# Patient Record
Sex: FEMALE
[De-identification: names, ages, dates, MRNs, and addresses within clinical notes are randomized per-mention and may not be internally consistent; named-entity substitution may affect disease eponyms.]

---

## 2023-05-01 ENCOUNTER — RX ONLY (OUTPATIENT)
Age: 48
Setting detail: RX ONLY
End: 2023-05-01

## 2024-07-22 ENCOUNTER — RX ONLY (OUTPATIENT)
Age: 49
Setting detail: RX ONLY
End: 2024-07-22

## 2024-07-23 ENCOUNTER — APPOINTMENT (RX ONLY)
Dept: URBAN - METROPOLITAN AREA CLINIC 129 | Facility: CLINIC | Age: 49
Setting detail: DERMATOLOGY
End: 2024-07-23

## 2024-07-23 DIAGNOSIS — L20.89 OTHER ATOPIC DERMATITIS: ICD-10-CM

## 2024-07-23 PROCEDURE — 99203 OFFICE O/P NEW LOW 30 MIN: CPT

## 2024-07-23 PROCEDURE — ? COUNSELING

## 2024-07-23 PROCEDURE — ? PRESCRIPTION

## 2024-07-23 RX ORDER — RUXOLITINIB 15 MG/G
CREAM TOPICAL
Qty: 60 | Refills: 5 | Status: ERX | COMMUNITY
Start: 2024-07-23

## 2024-07-23 RX ADMIN — RUXOLITINIB: 15 CREAM TOPICAL at 00:00

## 2024-07-23 ASSESSMENT — LOCATION DETAILED DESCRIPTION DERM
LOCATION DETAILED: LEFT SUPERIOR LATERAL MALAR CHEEK
LOCATION DETAILED: RIGHT SUPERIOR CENTRAL MALAR CHEEK
LOCATION DETAILED: LEFT SUPERIOR CENTRAL BUCCAL CHEEK
LOCATION DETAILED: INFERIOR MID FOREHEAD
LOCATION DETAILED: RIGHT SUPERIOR MEDIAL BUCCAL CHEEK

## 2024-07-23 ASSESSMENT — LOCATION SIMPLE DESCRIPTION DERM
LOCATION SIMPLE: LEFT CHEEK
LOCATION SIMPLE: INFERIOR FOREHEAD
LOCATION SIMPLE: RIGHT CHEEK

## 2024-07-23 ASSESSMENT — LOCATION ZONE DERM: LOCATION ZONE: FACE

## 2024-07-23 NOTE — HPI: RASH
What Type Of Note Output Would You Prefer (Optional)?: Standard Output
How Severe Is Your Rash?: moderate
Is This A New Presentation, Or A Follow-Up?: Rash
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 Additionally, desonide has been applied to affected areas, but did not help.

## 2024-08-12 ENCOUNTER — APPOINTMENT (RX ONLY)
Dept: URBAN - METROPOLITAN AREA CLINIC 129 | Facility: CLINIC | Age: 49
Setting detail: DERMATOLOGY
End: 2024-08-12

## 2024-08-12 DIAGNOSIS — L23.9 ALLERGIC CONTACT DERMATITIS, UNSPECIFIED CAUSE: ICD-10-CM

## 2024-08-12 PROCEDURE — 95044 PATCH/APPLICATION TESTS: CPT

## 2024-08-12 PROCEDURE — ? ADDITIONAL NOTES

## 2024-08-12 PROCEDURE — ? PATCH TESTING

## 2024-08-12 ASSESSMENT — LOCATION DETAILED DESCRIPTION DERM: LOCATION DETAILED: INFERIOR THORACIC SPINE

## 2024-08-12 ASSESSMENT — LOCATION SIMPLE DESCRIPTION DERM: LOCATION SIMPLE: UPPER BACK

## 2024-08-12 ASSESSMENT — LOCATION ZONE DERM: LOCATION ZONE: TRUNK

## 2024-08-12 NOTE — PROCEDURE: PATCH TESTING
Consent: Written consent obtained, risks reviewed including but not limited to rash, itching, allergic reaction, systemic rash, remote possiblity of anaphylaxis to allergen.
Detail Level: None
Number Of Patches (Maximum Allowable Per Dos By Cms Is 90): 80
Post-Care Instructions: I reviewed with the patient in detail post-care instructions. Patient should not sweat, pick at, or get the patches wet for 48 hours.

## 2024-08-14 ENCOUNTER — APPOINTMENT (RX ONLY)
Dept: URBAN - METROPOLITAN AREA CLINIC 129 | Facility: CLINIC | Age: 49
Setting detail: DERMATOLOGY
End: 2024-08-14

## 2024-08-14 DIAGNOSIS — L23.9 ALLERGIC CONTACT DERMATITIS, UNSPECIFIED CAUSE: ICD-10-CM

## 2024-08-14 PROCEDURE — ? COUNSELING

## 2024-08-14 PROCEDURE — ? CORE ACDS PATCH TEST READING

## 2024-08-14 PROCEDURE — 99212 OFFICE O/P EST SF 10 MIN: CPT

## 2024-08-17 ENCOUNTER — APPOINTMENT (RX ONLY)
Dept: URBAN - METROPOLITAN AREA CLINIC 129 | Facility: CLINIC | Age: 49
Setting detail: DERMATOLOGY
End: 2024-08-17

## 2024-08-17 DIAGNOSIS — L23.9 ALLERGIC CONTACT DERMATITIS, UNSPECIFIED CAUSE: ICD-10-CM

## 2024-08-17 DIAGNOSIS — L82.0 INFLAMED SEBORRHEIC KERATOSIS: ICD-10-CM

## 2024-08-17 PROCEDURE — 99213 OFFICE O/P EST LOW 20 MIN: CPT | Mod: 25

## 2024-08-17 PROCEDURE — ? LIQUID NITROGEN

## 2024-08-17 PROCEDURE — ? CORE ACDS PATCH TEST READING

## 2024-08-17 PROCEDURE — ? TREATMENT REGIMEN

## 2024-08-17 PROCEDURE — 17110 DESTRUCTION B9 LES UP TO 14: CPT

## 2024-08-17 PROCEDURE — ? COUNSELING

## 2024-08-17 ASSESSMENT — LOCATION DETAILED DESCRIPTION DERM
LOCATION DETAILED: LEFT CENTRAL MALAR CHEEK
LOCATION DETAILED: RIGHT MEDIAL MALAR CHEEK
LOCATION DETAILED: LEFT INFERIOR CENTRAL MALAR CHEEK

## 2024-08-17 ASSESSMENT — LOCATION ZONE DERM: LOCATION ZONE: FACE

## 2024-08-17 ASSESSMENT — LOCATION SIMPLE DESCRIPTION DERM
LOCATION SIMPLE: RIGHT CHEEK
LOCATION SIMPLE: LEFT CHEEK

## 2024-08-17 NOTE — PROCEDURE: CORE ACDS PATCH TEST READING
Allergen 42 Reaction: no reaction
Name Of Allergen 13: a-cpyq-Vdkqfxchphd formaldehyde resin 1% pet
Name Of Allergen 25: Diazolidinyl urea 1% pet
Name Of Allergen 72: Clobetasol-17-propionate 1% pet
Name Of Allergen 8: Paraben mix 12% pet
Name Of Allergen 67: Sorbitan sesquioleate 20% pet
Name Of Allergen 18: Quaternium 15 2% pet
Show Negative Results In The Note?: Yes
Name Of Allergen 43: Hydroxyethyl methacrylate 2% pet
Name Of Allergen 30: Budesonide 0.1% pet
Name Of Allergen 5: DMDM hydantoin 1% pet
Name Of Allergen 48: Cinnamic aldehyde 1% pet
Name Of Allergen 77: Benzoic acid 5% pet
Name Of Allergen 53: Propolis 10% pet
Show Allergen Counseling In The Note?: No
Name Of Allergen 35: Disperse blue 106/124 mix 1% pet
Name Of Allergen 63: Sorbic acid 2% pet
Name Of Allergen 22: Mercapto mix 1% pet
Name Of Allergen 58: Cocamide ALBERT 0.5% pet
Name Of Allergen 69: Cetyl steryl alcohol 20% pet
Name Of Allergen 79: 2-Ethylhexyl-4-methoxycinnamate 10% pet
Name Of Allergen 27: Tixocortol-21-pivalate
Name Of Allergen 10: Balsam of [place] 25% pet
Name Of Allergen 1: Nickel sulfate 2.5% pet
Name Of Allergen 65: Compositae mix II 5% pet
Name Of Allergen 36: Lidocaine 15% pet
Name Of Allergen 64: Marli 2% pet
Name Of Allergen 41: Mixed dialkyl thioureas 1% pet
Name Of Allergen 31: Hydrocortisone-17-butyrate 1% pet
Name Of Allergen 70: Ethylhexylglycerin 5% pet
Name Of Allergen 59: 4-Chloro-3-cresol ([facility]) 1% pet
Name Of Allergen 80: Benzyl alcohol 10% soft
Name Of Allergen 28: Gold sodium thiosulfate 2% pet
Name Of Allergen 11: Ethylenediamine dihydrochloride 1% pet
Name Of Allergen 21: Formaldehyde 2% aq
Name Of Allergen 46: Methyl methacrylate 2% pet
Name Of Allergen 75: Phenoxyethanol 1% pet
Name Of Allergen 16: Black rubber mix 0.6% pet
Name Of Allergen 51: Tea tree oil 5% pet
Name Of Allergen 33: Bacitracin 20% pet
Name Of Allergen 61: 2-Hydroxy-4-methoxybenzophenone-5-sulfonic acid (benzophenone-4) 2% pet
Name Of Allergen 56: Tosylamide formaldehyde resin 10% pet
Name Of Allergen 3: Neomycin 20% pet
Name Of Allergen 38: Iodopropynyl butylcarbamate 0.1% pet
Name Of Allergen 17: Tetracaine hydrochloride 5% pet
Name Of Allergen 4: Potassium dichromate 0.25% pet
Name Of Allergen 76: Disperse Orange 3 1% pet
Name Of Allergen 34: Fragrance mix II 14% pet
Number Of Patches Read: 80
Name Of Allergen 52: Chlorhexidine digluconate 0.5% aq
Name Of Allergen 29: Imidazolidinyl urea 2% pet
Name Of Allergen 57: Sesquiterpene lactone mix 0.1% pet
Name Of Allergen 26: Benzocaine 5% pet
Name Of Allergen 62: Sodium benzoate 5% pet
Name Of Allergen 39: Polymyxin B sulfate 3% pet
Name Of Allergen 9: Methylisothiazolinone 0.2% aq
Name Of Allergen 68: n,n-Diphenylguanidine 1% pet
Name Of Allergen 44: Oleamidopropyl dimethylamine 0.1% aq
Name Of Allergen 78: 2,6-Ditert-butyl-4-cresol (BHT) 2% pet
Name Of Allergen 14: Epoxy resin 1% pet
Name Of Allergen 54: Chloroxylenol (PCMX) 1% pet
Name Of Allergen 73: Amidoamine 0.1% aq
Name Of Allergen 19: Methyldibromoglutaronitrile 0.5% pet
Name Of Allergen 23: 2-Bromo-2-nitropropane-1,3-diol 0.5% pet
Name Of Allergen 49: D/L-?-Tocopherol 100%
Name Of Allergen 6: Fragrance mix I 8% pet
Name Of Allergen 50: Ethyl acrylate 0.1% pet
Name Of Allergen 40: Cocamidopropyl betaine 1% aq
Name Of Allergen 15: Carba mix 3% pet
Name Of Allergen 45: Decyl glucoside 5% pet
Name Of Allergen 74: Ethyl cyanoacrylate 10% pet
Name Of Allergen 20: p-Phenylenediamine 1% pet
Name Of Allergen 24: Thiuram mix 1% pet
Name Of Allergen 37: Propylene glycol 100% aq
Name Of Allergen 2: Lanolin alcohol (Amerchol 101) 50% pet
Name Of Allergen 66: Ethyleneurea melamine-formaldehyde 5% pet
Name Of Allergen 55: 2-Hydroxy-4-methoxybenzophenone (benzophenone-3) 10% pet
What Reading Time Point?: 96 hour
Name Of Allergen 32: Mercaptobenzothiazole 1% pet
Name Of Allergen 7: Colophony 20% pet
Name Of Allergen 60: Benzalkonium chloride 0.1% aq
Name Of Allergen 47: Lavender absolute 2% pet
Name Of Allergen 12: Cobalt chloride 1% pet
Name Of Allergen 42: 3-(Dimethylamino)-propylamine 1% aq
Detail Level: Zone
Name Of Allergen 71: Triamcinolone 1% pet